# Patient Record
Sex: FEMALE | URBAN - METROPOLITAN AREA
[De-identification: names, ages, dates, MRNs, and addresses within clinical notes are randomized per-mention and may not be internally consistent; named-entity substitution may affect disease eponyms.]

---

## 2024-07-11 ENCOUNTER — APPOINTMENT (RX ONLY)
Dept: URBAN - METROPOLITAN AREA CLINIC 46 | Facility: CLINIC | Age: 44
Setting detail: DERMATOLOGY
End: 2024-07-11

## 2024-07-11 DIAGNOSIS — L40.8 OTHER PSORIASIS: ICD-10-CM | Status: INADEQUATELY CONTROLLED

## 2024-07-11 PROCEDURE — ? PRESCRIPTION

## 2024-07-11 PROCEDURE — ? COUNSELING

## 2024-07-11 PROCEDURE — 99204 OFFICE O/P NEW MOD 45 MIN: CPT

## 2024-07-11 PROCEDURE — ? PRESCRIPTION MEDICATION MANAGEMENT

## 2024-07-11 RX ORDER — CLOBETASOL PROPIONATE 0.05 G/100ML
LOTION TOPICAL
Qty: 118 | Refills: 1 | Status: ERX | COMMUNITY
Start: 2024-07-11

## 2024-07-11 RX ORDER — MINOXIDIL 50 MG/G
AEROSOL, FOAM TOPICAL
Qty: 60 | Refills: 3 | Status: ERX | COMMUNITY
Start: 2024-07-11

## 2024-07-11 RX ADMIN — MINOXIDIL: 50 AEROSOL, FOAM TOPICAL at 00:00

## 2024-07-11 RX ADMIN — CLOBETASOL PROPIONATE: 0.05 LOTION TOPICAL at 00:00

## 2024-07-11 ASSESSMENT — LOCATION SIMPLE DESCRIPTION DERM
LOCATION SIMPLE: SCALP
LOCATION SIMPLE: POSTERIOR SCALP

## 2024-07-11 ASSESSMENT — BSA PSORIASIS: % BODY COVERED IN PSORIASIS: 2

## 2024-07-11 ASSESSMENT — LOCATION DETAILED DESCRIPTION DERM
LOCATION DETAILED: MID-OCCIPITAL SCALP
LOCATION DETAILED: RIGHT CENTRAL FRONTAL SCALP

## 2024-07-11 ASSESSMENT — ITCH NUMERIC RATING SCALE: HOW SEVERE IS YOUR ITCHING?: 7

## 2024-07-11 ASSESSMENT — SEVERITY ASSESSMENT: HOW SEVERE IS THIS PATIENT'S CONDITION?: MILD

## 2024-07-11 ASSESSMENT — PGA PSORIASIS: PGA PSORIASIS 2020: MILD

## 2024-07-11 ASSESSMENT — LOCATION ZONE DERM: LOCATION ZONE: SCALP

## 2024-07-11 NOTE — HPI: RASH
How Severe Is Your Rash?: mild
Is This A New Presentation, Or A Follow-Up?: Rash
Additional History: Patient is here for evaluation of an itchy, flaky rash on the scalp, states when she pulls the flakes out, hair comes out with it. Rash ongoing for 2 years, states itching worsens when she gets sweaty. Patient has tried Head and shoulder shampoo with minimal improvement. Patient was also prescribed a medicated shampoo last year by a dermatologist but states it did not help. Patient states she has cramps in the feet and toes more recently, denies h/o arthritis.

## 2024-07-11 NOTE — PROCEDURE: PRESCRIPTION MEDICATION MANAGEMENT
Initiate Treatment: clobetasol 0.05 % lotion \\nApply a small amount to the scalp once daily for a few days at a time as needed for itching.\\n\\nminoxidil 5 % topical foam \\nApply a small amount to the scalp once daily
Detail Level: Zone
Plan: Discussed option of another medicated shampoo, Ciclopirox, however, would not be favorable due to current length of patients hair, shampoo may cause dryness of the hair. Would favor topical steroid solution as the rash is localized to a few areas. Patient complained that topical solution she has used in the past caused burning, will prescribe \\n\\nCan use OTC hydrocortisone to rash behind the ears if they become cracked. \\n\\nAssociated Alopecia, would recommend Minoxidil 5%. Patient requested minoxidil be prescribed.
Render In Strict Bullet Format?: No

## 2025-04-08 ENCOUNTER — APPOINTMENT (OUTPATIENT)
Dept: URBAN - METROPOLITAN AREA CLINIC 46 | Facility: CLINIC | Age: 45
Setting detail: DERMATOLOGY
End: 2025-04-08

## 2025-04-08 DIAGNOSIS — L21.8 OTHER SEBORRHEIC DERMATITIS: ICD-10-CM | Status: INADEQUATELY CONTROLLED

## 2025-04-08 PROCEDURE — ? PRESCRIPTION

## 2025-04-08 PROCEDURE — ? COUNSELING

## 2025-04-08 PROCEDURE — ? PRESCRIPTION MEDICATION MANAGEMENT

## 2025-04-08 PROCEDURE — 99214 OFFICE O/P EST MOD 30 MIN: CPT

## 2025-04-08 RX ORDER — ROFLUMILAST 3 MG/G
AEROSOL, FOAM TOPICAL QD
Qty: 60 | Refills: 5 | Status: ERX | COMMUNITY
Start: 2025-04-08

## 2025-04-08 RX ADMIN — ROFLUMILAST: 3 AEROSOL, FOAM TOPICAL at 00:00

## 2025-04-08 ASSESSMENT — LOCATION SIMPLE DESCRIPTION DERM
LOCATION SIMPLE: LEFT SCALP
LOCATION SIMPLE: RIGHT SCALP

## 2025-04-08 ASSESSMENT — SEVERITY ASSESSMENT: HOW SEVERE IS THIS PATIENT'S CONDITION?: MODERATE

## 2025-04-08 ASSESSMENT — LOCATION DETAILED DESCRIPTION DERM
LOCATION DETAILED: RIGHT CENTRAL FRONTAL SCALP
LOCATION DETAILED: LEFT CENTRAL FRONTAL SCALP

## 2025-04-08 ASSESSMENT — LOCATION ZONE DERM: LOCATION ZONE: SCALP

## 2025-04-08 NOTE — PROCEDURE: PRESCRIPTION MEDICATION MANAGEMENT
Plan: Moderate seb derm of scalp, seven hairline, with some thinning of hair at temples. Present 3 years.\\n\\nPatient experiencing loss of hair on the forehead, L & R temple. I think 2/2 scratching, rubbing from itching.\\n\\nAdvised patient the first course of treatment is to use Zoryve to help control the inflammation to prevent itching and flaking for first 2 months. \\nAdvised patient of invasive method of diagnosis (biopsy by shave on the scalp) which can potentially leave a scar. Will consider next visit if there’s no improvement. \\n\\nPatient does highlights to her hair and washes her hair every three days or more.\\n\\nZoryve 0.3 % topical foam QD\\nQuantity: 60.0 g  Days Supply: 30\\nSig: Apply small amount to scalp once daily as needed\\n\\nOTC cerave dandruff shampoo and conditioner. Wash hair 1-2x weekly \\n\\nRtc 2 months if unresolved
Detail Level: Zone
Render In Strict Bullet Format?: No
Initiate Treatment: Zoryve 0.3 % topical foam QD\\nQuantity: 60.0 g  Days Supply: 30\\nSig: Apply small amount to scalp once daily as needed\\n\\nOTC cerave dandruff shampoo and conditioner. Wash hair 1-2x weekly
Samples Given: Cerave shampoo and conditioner
No